# Patient Record
Sex: FEMALE | Race: WHITE | Employment: UNEMPLOYED | URBAN - METROPOLITAN AREA
[De-identification: names, ages, dates, MRNs, and addresses within clinical notes are randomized per-mention and may not be internally consistent; named-entity substitution may affect disease eponyms.]

---

## 2017-04-12 ENCOUNTER — APPOINTMENT (OUTPATIENT)
Dept: CT IMAGING | Facility: HOSPITAL | Age: 32
DRG: 897 | End: 2017-04-12
Attending: EMERGENCY MEDICINE

## 2017-04-12 PROBLEM — F10.929 ALCOHOL INTOXICATION (HCC): Status: ACTIVE | Noted: 2017-04-12

## 2017-04-12 PROBLEM — F10.920 ALCOHOL INTOXICATION, UNCOMPLICATED (HCC): Status: ACTIVE | Noted: 2017-04-12

## 2017-04-12 PROCEDURE — 70450 CT HEAD/BRAIN W/O DYE: CPT

## 2017-04-12 NOTE — ED PROVIDER NOTES
Patient Seen in: [facility] Emergency Department    History   Patient presents with:  Alcohol Intoxication (neurologic)    Stated Complaint: alcohol intoxication, weakness, wants detox    HPI    59-year-old female with history of depression, anorexi Systems    Positive for stated complaint: alcohol intoxication, weakness, wants detox  Other systems are as noted in HPI. Constitutional and vital signs reviewed. All other systems reviewed and negative except as noted above.     [facility] elements reviewe intact, no pronator drift, sensation intact.         ED Course     Labs Reviewed   URINALYSIS WITH CULTURE REFLEX - Abnormal; Notable for the following:     Urine Color Colorless (*)     pH Urine 9.0 (*)     All other components within normal limits   ETHYL certificate in the chart because the patient is not suicidal, homicidal, or unable to care for self due to a mental illness. Substance use disorders are not considered mental illnesses in the state of [place].       Once the patient is able to communicate

## 2017-04-12 NOTE — ED INITIAL ASSESSMENT (HPI)
Patient drinks 20-30 beers per day.  states that she has been less responsive over the last 2-3 days. Last drink PTA. Requesting detox.
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
## 2017-04-13 NOTE — DIETARY NOTE
NUTRITION INITIAL ASSESSMENT    Pt is at moderate nutrition risk. Pt does not meet malnutrition criteria.     NUTRITION DIAGNOSIS/PROBLEM:    Underweight related to eating disorder(anorexia)  as evidenced by calorie counts and supplements if meals are not c HISTORY:  Appetite: Fair  Intake: >75% since admission  Intake Meeting Needs: Yes  Food Allergies: No  Cultural/Ethnic/Mandaeism Preferences Addresses: Yes    NUTRITION RELATED PHYSICAL FINDINGS:     1. Body Fat/Muscle Mass: BMI- 18.45kg/m2     2.  Fluid Ac

## 2017-04-13 NOTE — PAYOR COMM NOTE
Attending Physician: Duong Man MD     4/12    ED    Alcohol Intoxication     Stated Complaint: alcohol intoxication, weakness, wants detox    HPI    35-year-old female with history of depression, anorexia, bulimia, and alcohol abuse presents emergency Narrative:     Results of the Urine Drug Screen should be used only for medical purposes. PTT, ACTIVATED - Normal    Narrative: The aPTT Heparin Therapeutic Range is approximately 65- 104 seconds.  The therapeutic range has been validated against 0.3-

## 2017-04-13 NOTE — PLAN OF CARE
Patient/Family Goals    • Patient/Family Long Term Goal Progressing    • Patient/Family Short Term Goal Progressing        Risk for Violence-Violent Restraints/Seclusion    • Patient will not express any violent or self-destructive behaviors Progressing

## 2017-04-13 NOTE — CM/SW NOTE
SW found pt thru casefinding for self pay/no insurance and met with pt for d/c planning. Pt is a 28 y.o female admitted on 04/12 for ETOH intoxication and W/D and AMS. Pt's medical history includes h/o anorexia, depression, PTSD and ETOH abuse.  Pt has no

## 2017-04-13 NOTE — PROGRESS NOTES
NURSING ADMISSION NOTE      Patient admitted via Cart  Oriented to room. Safety precautions initiated. Bed in low position. Call light in reach. Belongings removed d/t suicide precautions. Sitter at bedside.     Patient initially irritable, reque

## 2017-04-14 NOTE — PLAN OF CARE
DRUG ABUSE/DETOX    • Will have no detox symptoms and will verbalize plan for changing drug-related behavior Progressing        METABOLIC/FLUID AND ELECTROLYTES - ADULT    • Electrolytes maintained within normal limits Progressing        Patient/Family [facility]

## 2017-04-14 NOTE — PLAN OF CARE
DRUG ABUSE/DETOX    • Will have no detox symptoms and will verbalize plan for changing drug-related behavior Progressing        METABOLIC/FLUID AND ELECTROLYTES - ADULT    • Electrolytes maintained within normal limits Progressing        Patient/Family [facility]

## 2017-04-14 NOTE — BH PROGRESS NOTE
Went to see the pt per psychiatrist, Dr. Milana Murrieta. She was given referrals to f/u with a psychiatrist and outpt cd programs.

## 2017-04-14 NOTE — PLAN OF CARE
NURSING DISCHARGE NOTE    Discharged Home via Wheelchair. Accompanied by Family member and Support staff  Belongings Taken by patient/family. Pt discharged home. IV removed. Discharge paperwork and prescriptions given to pt.  at bedside.  All

## 2017-04-14 NOTE — DIETARY NOTE
Nutrition Short Note  3 day calorie count started - envelope hanging on door and will be monitored daily by RD.      Date: 4/13  Breakfast: 746 calories, 40 grams protein  Lunch: 454 calories, 30 g protein  Dinner: 433 calories, 26 grams protein    Daily to

## 2017-04-14 NOTE — DISCHARGE SUMMARY
[facility]OSPITAL[facility]  DISCHARGE SUMMARY     Maer Marion Patient Status:  Observation    2/10/1985 MRN [id number]   [facility] 3NE-A Attending Milka Short MD   Hosp Day # 2 PCP Giucho Sun     Date of Admission: 17  Date of D placed on the eating disorder protocol and had no issues with eating her meals. She had a smooth detox with minimal withdrawal symptoms. She was discharged home on 4/14/17.      Procedures during hospitalization:   • None    Incidental or significant findin 16  BP: (105-124)/(58-86) 124/79 mmHg    Physical Exam:    General: No acute distress. Respiratory: Clear to auscultation bilaterally. No wheezes. No rhonchi. Cardiovascular: S1, S2. Regular rate and rhythm. No murmurs, rubs or gallops.    Abdomen: Soft,

## 2017-04-19 NOTE — CM/SW NOTE
Pt d/c 04/14 home with no needs.        04/19/17 0800   Discharge disposition   Discharged to: Home or Self   Discharge transportation Private car

## 2017-05-12 NOTE — ED INITIAL ASSESSMENT (HPI)
Pt presents to the ED requesting detox from alcohol. Per spouse pt has been drinking at least a bottle of vodka a day since Sunday. Pt seated in wheelchair with eyes closed, but responds to verbal, oriented x 3, skin w/d,resps reg/unlabored.  Speech slurred

## 2017-05-12 NOTE — ED PROVIDER NOTES
Patient Seen in: [facility] Emergency Department    History   Patient presents with:  Eval-P (psychiatric)    Stated Complaint: Here for ETOH detox    HPI     Patient is a 27-year-old female comes in emergency room for alcohol intoxication.   Patient Pulse 05/12/17 1408 100   Resp 05/12/17 1408 16   Temp 05/12/17 1408 98.1 °F (36.7 °C)   Temp src 05/12/17 1408 Temporal   SpO2 05/12/17 1408 97 %   O2 Device 05/12/17 1408 None (Room air)       Current:BP 97/65 mmHg  Pulse 84  Temp(Src) 99 °F (37.2 °C) PLATELET.   Procedure                               Abnormality         Status                     ---------                               -----------         ------                     CBC W/ DIFFERENTIAL[[id number]]                              Final resul

## 2018-09-21 ENCOUNTER — LAB SERVICES (OUTPATIENT)
Dept: OTHER | Age: 33
End: 2018-09-21

## 2018-09-21 ENCOUNTER — CHARTING TRANS (OUTPATIENT)
Dept: OTHER | Age: 33
End: 2018-09-21

## 2018-09-24 ENCOUNTER — CHARTING TRANS (OUTPATIENT)
Dept: OTHER | Age: 33
End: 2018-09-24

## 2018-09-25 ENCOUNTER — CHARTING TRANS (OUTPATIENT)
Dept: OTHER | Age: 33
End: 2018-09-25

## 2018-09-25 LAB
ALBUMIN SERPL-MCNC: 3.9 G/DL (ref 3.6–5.1)
ALBUMIN/GLOB SERPL: 1.4 (ref 1–2.4)
ALP SERPL-CCNC: 45 UNITS/L (ref 45–117)
ALT SERPL-CCNC: 55 UNITS/L
ANION GAP SERPL CALC-SCNC: 12 MMOL/L (ref 10–20)
AST SERPL-CCNC: 26 UNITS/L
BASOPHILS # BLD: 0.1 K/MCL (ref 0–0.3)
BASOPHILS NFR BLD: 1 %
BILIRUB SERPL-MCNC: 0.7 MG/DL (ref 0.2–1)
BUN SERPL-MCNC: 7 MG/DL (ref 6–20)
BUN/CREAT SERPL: 11 (ref 7–25)
CALCIUM SERPL-MCNC: 8.6 MG/DL (ref 8.4–10.2)
CHLORIDE SERPL-SCNC: 111 MMOL/L (ref 98–107)
CO2 SERPL-SCNC: 23 MMOL/L (ref 21–32)
CREAT SERPL-MCNC: 0.65 MG/DL (ref 0.51–0.95)
DIFFERENTIAL METHOD BLD: ABNORMAL
EOSINOPHIL # BLD: 0.2 K/MCL (ref 0.1–0.5)
EOSINOPHIL NFR BLD: 3 %
ERYTHROCYTE [DISTWIDTH] IN BLOOD: 12.9 % (ref 11–15)
GLOBULIN SER-MCNC: 2.7 G/DL (ref 2–4)
GLUCOSE SERPL-MCNC: 87 MG/DL (ref 65–99)
HEMATOCRIT: 41.3 % (ref 36–46.5)
HEMOGLOBIN: 13 G/DL (ref 12–15.5)
IMM GRANULOCYTES # BLD AUTO: 0 K/MCL (ref 0–0.2)
IMM GRANULOCYTES NFR BLD: 0 %
LENGTH OF FAST TIME PATIENT: ABNORMAL HRS
LYMPHOCYTES # BLD: 3.4 K/MCL (ref 1–4.8)
LYMPHOCYTES NFR BLD: 56 %
MEAN CORPUSCULAR HEMOGLOBIN: 28.7 PG (ref 26–34)
MEAN CORPUSCULAR HGB CONC: 31.5 G/DL (ref 32–36.5)
MEAN CORPUSCULAR VOLUME: 91.2 FL (ref 78–100)
MONOCYTES # BLD: 0.6 K/MCL (ref 0.3–0.9)
MONOCYTES NFR BLD: 9 %
NEUTROPHILS # BLD: 1.9 K/MCL (ref 1.8–7.7)
NEUTROPHILS NFR BLD: 31 %
NRBC (NRBCRE): 0 /100 WBC
PLATELET COUNT: 316 K/MCL (ref 140–450)
POTASSIUM SERPL-SCNC: 4.8 MMOL/L (ref 3.4–5.1)
RED CELL COUNT: 4.53 MIL/MCL (ref 4–5.2)
SODIUM SERPL-SCNC: 141 MMOL/L (ref 135–145)
TOTAL PROTEIN: 6.6 G/DL (ref 6.4–8.2)
WHITE BLOOD COUNT: 6.1 K/MCL (ref 4.2–11)

## 2018-09-27 ENCOUNTER — CHARTING TRANS (OUTPATIENT)
Dept: OTHER | Age: 33
End: 2018-09-27

## 2018-09-27 LAB — PAP WITH HIGH RISK HPV: NORMAL

## 2018-12-08 VITALS
SYSTOLIC BLOOD PRESSURE: 104 MMHG | WEIGHT: 147.99 LBS | RESPIRATION RATE: 16 BRPM | BODY MASS INDEX: 20.72 KG/M2 | DIASTOLIC BLOOD PRESSURE: 78 MMHG | HEIGHT: 71 IN | TEMPERATURE: 98.4 F | OXYGEN SATURATION: 98 % | HEART RATE: 85 BPM

## 2018-12-08 VITALS
SYSTOLIC BLOOD PRESSURE: 100 MMHG | DIASTOLIC BLOOD PRESSURE: 70 MMHG | TEMPERATURE: 98.5 F | RESPIRATION RATE: 16 BRPM | BODY MASS INDEX: 21 KG/M2 | HEART RATE: 70 BPM | OXYGEN SATURATION: 99 % | HEIGHT: 71 IN | WEIGHT: 150 LBS

## 2019-02-12 NOTE — ED NOTES
Dr Radha Montalvo is at the bedside
Family at bedside.
Food tray was ordered
One to one seclusion was reordered at 1815
One to one seclusion was started at [id number]
Patient continues to rest on the stretcher
Patient received her food tray  Is sitting up in bed comfortably  Calm/cooperative at the moment
Patients  did leave  He took the patients clothes with him    The husbands correct number is in the chart
Patients  took her bag of clothes  He stated that he is going to bring them home with him when he leaves  RN neela was notified
Pt asleep but easily aroused. Pt calm and cooperative. Pt appears comfortable. pt tolerated dinner tray well, states she is still hungry. Pt offered turkey sandwich.
Pt asleep on cart and in no distress.
Pt asleep on cart but arousable to verbal stimuli. Pt repositioned for comfort and brief placed at this time. Pt states she does not feel like she has to urinate, but states she would like some water.
Pt calm and cooperative at this time, aware ROBERT to speak with pt once her alcohol level decreases. Pt tolerated water without difficulty. Pt requesting to eat.
Pt given a sandwich and juice.
Pt given a warm blanket.
Pt given turkey sandwich per request. Pt appears comfortable on cart.
Pt placed on stretcher at this time for safety due to pt appears to be intoxicated. Spouse at bedside. Awaiting bed to be cleaned at this time.
Pt remains asleep on cart. Pt appears comfortable. Concern pt will be unable to swallow pill at this time due to level of drowsiness and intoxication, so will discuss with doctor if can wait a little bit prior to administering.
Pt status unchanged at this time.
Pt status unchanged.
Pt's belonging placed in bag, RN transferring pt to B-Pod
Report given to BETSY Arrington
awakening delayed

## 2024-07-31 ENCOUNTER — TELEPHONE (OUTPATIENT)
Dept: SURGERY | Facility: CLINIC | Age: 39
End: 2024-07-31

## 2024-07-31 NOTE — TELEPHONE ENCOUNTER
Received referral from [facility]    Dx: hematuria    Referral in binder at

## (undated) NOTE — IP AVS SNAPSHOT
[facility] [place]  [address] [place], [address] ~ [phone]                Discharge Summary   4/12/2017    Katie Hernandez           Admission Information        Provider Department    4/12/2017 Chiquita Gaming MD  3ne-A Take 0.5 tablets (75 mg total) by mouth nightly as needed for Sleep (insomnia).     Zayda Kline                                Where to Get Your Medications      Please  your prescriptions at the location directed by your doctor or nurse     Br Medications Sent Home None to return    Medications Returned:           Additional Information       We are concerned for your overall well being:    - If you are a smoker or have smoked in the last 12 months, we encourage you to explore options for quitti prescribed to take and their potential SIDE EFFECTS. Your nurse will review your medications with you before you are discharged, and can provide you with additional printed information.  Not all patients will experience these side effects or respond to [facility]

## (undated) NOTE — ED AVS SNAPSHOT
[facility] Emergency Department    [place]  [address] [place] [address]    Phone:  [phone]    Fax:  [phone]           Ulises Hassan   MRN: [id number]    Department:  [facility] Emergency Department   Date of Visit:  5 [phone]       To Check ER Wait Times:  TEXT 'ERwait' to 89800      Click [email or web address]      Or call [phone]    If you have any problems with your follow-up, please call our  at [phone]    Dario bearda con I have read and understand the instructions given to me by my caregivers. 24-Hour Pharmacies        Pharmacy Address Phone Number   [facility] [address] ([address]) Aman Alcala visit,  view other health information, and more. To sign up or find more information, go to [email or web address] and click on the Sign Up Now link in the [employer] box.      Enter your My[employer] Activation Code exactly as it appears below along with yo

## (undated) NOTE — ED AVS SNAPSHOT
[facility] Emergency Department    [place]  [address] [address]    Phone:  [phone]    Fax:  [phone]           Etta Roland   MRN: [id number]    Department:  [facility] Emergency Department   Date of Visit:  5 IF THERE IS ANY CHANGE OR WORSENING OF YOUR CONDITION, CALL YOUR PRIMARY CARE PHYSICIAN AT ONCE OR RETURN IMMEDIATELY TO THE EMERGENCY DEPARTMENT.     If you have been prescribed any medication(s), please fill your prescription right away and begin taking t